# Patient Record
Sex: MALE | URBAN - METROPOLITAN AREA
[De-identification: names, ages, dates, MRNs, and addresses within clinical notes are randomized per-mention and may not be internally consistent; named-entity substitution may affect disease eponyms.]

---

## 2019-08-30 ENCOUNTER — CONSULT (OUTPATIENT)
Dept: URBAN - METROPOLITAN AREA CLINIC 10 | Facility: CLINIC | Age: 49
End: 2019-08-30
Payer: COMMERCIAL

## 2019-08-30 DIAGNOSIS — H40.013 OPEN ANGLE WITH BORDERLINE FINDINGS, LOW RISK, BILATERAL: ICD-10-CM

## 2019-08-30 PROCEDURE — 92133 CPTRZD OPH DX IMG PST SGM ON: CPT | Performed by: OPHTHALMOLOGY

## 2019-08-30 PROCEDURE — 92083 EXTENDED VISUAL FIELD XM: CPT | Performed by: OPHTHALMOLOGY

## 2019-08-30 PROCEDURE — 92020 GONIOSCOPY: CPT | Performed by: OPHTHALMOLOGY

## 2019-08-30 PROCEDURE — 76514 ECHO EXAM OF EYE THICKNESS: CPT | Performed by: OPHTHALMOLOGY

## 2019-08-30 PROCEDURE — 92004 COMPRE OPH EXAM NEW PT 1/>: CPT | Performed by: OPHTHALMOLOGY

## 2019-08-30 ASSESSMENT — INTRAOCULAR PRESSURE
OD: 24
OS: 20

## 2019-08-30 ASSESSMENT — VISUAL ACUITY
OD: 20/20
OS: 20/20

## 2020-07-10 ENCOUNTER — FOLLOW UP ESTABLISHED (OUTPATIENT)
Dept: URBAN - METROPOLITAN AREA CLINIC 10 | Facility: CLINIC | Age: 50
End: 2020-07-10
Payer: COMMERCIAL

## 2020-07-10 DIAGNOSIS — H40.051 OCULAR HYPERTENSION, RIGHT EYE: Primary | ICD-10-CM

## 2020-07-10 DIAGNOSIS — H25.13 AGE-RELATED NUCLEAR CATARACT, BILATERAL: ICD-10-CM

## 2020-07-10 PROCEDURE — 92014 COMPRE OPH EXAM EST PT 1/>: CPT | Performed by: OPTOMETRIST

## 2020-07-10 PROCEDURE — 92004 COMPRE OPH EXAM NEW PT 1/>: CPT | Performed by: OPTOMETRIST

## 2020-07-10 PROCEDURE — 92133 CPTRZD OPH DX IMG PST SGM ON: CPT | Performed by: OPTOMETRIST

## 2020-07-10 PROCEDURE — 92083 EXTENDED VISUAL FIELD XM: CPT | Performed by: OPTOMETRIST

## 2020-07-10 ASSESSMENT — INTRAOCULAR PRESSURE
OD: 20
OS: 19

## 2020-07-10 ASSESSMENT — VISUAL ACUITY
OS: 20/20
OD: 20/25

## 2023-03-03 ENCOUNTER — OFFICE VISIT (OUTPATIENT)
Dept: URBAN - METROPOLITAN AREA CLINIC 10 | Facility: CLINIC | Age: 53
End: 2023-03-03
Payer: COMMERCIAL

## 2023-03-03 DIAGNOSIS — H25.13 AGE-RELATED NUCLEAR CATARACT, BILATERAL: Primary | ICD-10-CM

## 2023-03-03 DIAGNOSIS — H40.013 OPEN ANGLE WITH BORDERLINE FINDINGS, LOW RISK, BILATERAL: ICD-10-CM

## 2023-03-03 PROCEDURE — 92083 EXTENDED VISUAL FIELD XM: CPT | Performed by: STUDENT IN AN ORGANIZED HEALTH CARE EDUCATION/TRAINING PROGRAM

## 2023-03-03 PROCEDURE — 76514 ECHO EXAM OF EYE THICKNESS: CPT | Performed by: STUDENT IN AN ORGANIZED HEALTH CARE EDUCATION/TRAINING PROGRAM

## 2023-03-03 PROCEDURE — 92133 CPTRZD OPH DX IMG PST SGM ON: CPT | Performed by: STUDENT IN AN ORGANIZED HEALTH CARE EDUCATION/TRAINING PROGRAM

## 2023-03-03 PROCEDURE — 99214 OFFICE O/P EST MOD 30 MIN: CPT | Performed by: STUDENT IN AN ORGANIZED HEALTH CARE EDUCATION/TRAINING PROGRAM

## 2023-03-03 ASSESSMENT — VISUAL ACUITY
OS: 20/20
OD: 20/20

## 2023-03-03 ASSESSMENT — INTRAOCULAR PRESSURE
OD: 20
OS: 22

## 2023-03-03 NOTE — IMPRESSION/PLAN
Impression: Open angle with borderline findings, low risk, bilateral
 Pt denies FHx Pt denies Sulfa Allergy   // Pt denies Lung /Heart dx 24-2 HVF (03/03/23) OD: Full OS: Full RNFL OCT (03/03/23) OD: Full OS: Full Pachs (03/03/23) OD: 566 OS: 554 Disc photos Plan: Discussed findings, IOP's 20/22 today
low risk RTC 1 year for CE c 24-2HVF/RNFL/Mac OCT

## 2023-04-13 ENCOUNTER — OFFICE VISIT (OUTPATIENT)
Dept: URBAN - METROPOLITAN AREA CLINIC 10 | Facility: CLINIC | Age: 53
End: 2023-04-13
Payer: COMMERCIAL

## 2023-04-13 DIAGNOSIS — H10.45 OTHER CHRONIC ALLERGIC CONJUNCTIVITIS: Primary | ICD-10-CM

## 2023-04-13 DIAGNOSIS — H40.013 OPEN ANGLE WITH BORDERLINE FINDINGS, LOW RISK, BILATERAL: ICD-10-CM

## 2023-04-13 PROCEDURE — 99214 OFFICE O/P EST MOD 30 MIN: CPT | Performed by: STUDENT IN AN ORGANIZED HEALTH CARE EDUCATION/TRAINING PROGRAM

## 2023-04-13 RX ORDER — PREDNISOLONE ACETATE 10 MG/ML
1 % SUSPENSION/ DROPS OPHTHALMIC
Qty: 10 | Refills: 1 | Status: ACTIVE
Start: 2023-04-13

## 2023-04-13 ASSESSMENT — INTRAOCULAR PRESSURE
OD: 24
OS: 24

## 2023-04-13 NOTE — IMPRESSION/PLAN
Impression: Other chronic allergic conjunctivitis: H10.45. Plan: Discussed findings,
Start Cool compresses Pred forte QID OU x 1 week then TID OU x 1 week then BID OU x 1 week then QD OU x 1 week then d/c May use [employer]aday

## 2023-04-13 NOTE — IMPRESSION/PLAN
Impression: Open angle with borderline findings, low risk, bilateral
 Pt denies FHx Pt denies Sulfa Allergy   // Pt denies Lung /Heart dx 24-2 HVF (03/03/23) OD: Full OS: Full RNFL OCT (03/03/23) OD: Full OS: Full Pachs (03/03/23) OD: 566 OS: 554 Disc photos Plan: IOP's stable today